# Patient Record
Sex: FEMALE | URBAN - METROPOLITAN AREA
[De-identification: names, ages, dates, MRNs, and addresses within clinical notes are randomized per-mention and may not be internally consistent; named-entity substitution may affect disease eponyms.]

---

## 2019-01-10 ENCOUNTER — APPOINTMENT (RX ONLY)
Dept: URBAN - METROPOLITAN AREA CLINIC 303 | Facility: CLINIC | Age: 29
Setting detail: DERMATOLOGY
End: 2019-01-10

## 2019-01-10 DIAGNOSIS — L71.8 OTHER ROSACEA: ICD-10-CM

## 2019-01-10 DIAGNOSIS — L71.0 PERIORAL DERMATITIS: ICD-10-CM

## 2019-01-10 DIAGNOSIS — K13.0 DISEASES OF LIPS: ICD-10-CM

## 2019-01-10 PROBLEM — L20.84 INTRINSIC (ALLERGIC) ECZEMA: Status: ACTIVE | Noted: 2019-01-10

## 2019-01-10 PROCEDURE — ? PRESCRIPTION

## 2019-01-10 PROCEDURE — ? PRESCRIPTION SAMPLES PROVIDED

## 2019-01-10 PROCEDURE — ? COUNSELING

## 2019-01-10 PROCEDURE — 99202 OFFICE O/P NEW SF 15 MIN: CPT

## 2019-01-10 PROCEDURE — ? EDUCATIONAL RESOURCES PROVIDED

## 2019-01-10 RX ORDER — PHARMACY COMPOUNDING ACCESSORY
EACH MISCELLANEOUS
Qty: 1 | Refills: 3 | Status: ERX | COMMUNITY
Start: 2019-01-10

## 2019-01-10 RX ORDER — CLINDAMYCIN PHOSPHATE 10 MG/ML
LOTION TOPICAL
Qty: 1 | Refills: 11 | Status: ERX | COMMUNITY
Start: 2019-01-10

## 2019-01-10 RX ORDER — METRONIDAZOLE 7.5 MG/G
CREAM TOPICAL
Qty: 1 | Refills: 11 | Status: ERX | COMMUNITY
Start: 2019-01-10

## 2019-01-10 RX ADMIN — Medication: at 20:59

## 2019-01-10 RX ADMIN — CLINDAMYCIN PHOSPHATE: 10 LOTION TOPICAL at 21:02

## 2019-01-10 RX ADMIN — METRONIDAZOLE: 7.5 CREAM TOPICAL at 21:40

## 2019-01-10 NOTE — HPI: RASH
How Severe Is Your Rash?: mild
Is This A New Presentation, Or A Follow-Up?: Rash
Additional History: She moved here from [place] in April. History of eczema. Rash also gets bumpy.
Additional History: She believes this to be Rosacea. She is interested in using products that are also good for anti-aging. Sun and alcohol makes it worse. Nothing comes to a head. No grittiness of eyes.